# Patient Record
Sex: MALE | Race: OTHER | URBAN - METROPOLITAN AREA
[De-identification: names, ages, dates, MRNs, and addresses within clinical notes are randomized per-mention and may not be internally consistent; named-entity substitution may affect disease eponyms.]

---

## 2024-11-21 ENCOUNTER — EMERGENCY (EMERGENCY)
Facility: HOSPITAL | Age: 59
LOS: 1 days | Discharge: HOME | End: 2024-11-21
Admitting: EMERGENCY MEDICINE
Payer: COMMERCIAL

## 2024-11-21 VITALS — HEART RATE: 48 BPM

## 2024-11-21 VITALS
DIASTOLIC BLOOD PRESSURE: 91 MMHG | HEART RATE: 50 BPM | TEMPERATURE: 98 F | RESPIRATION RATE: 17 BRPM | SYSTOLIC BLOOD PRESSURE: 137 MMHG | OXYGEN SATURATION: 95 % | HEIGHT: 66 IN

## 2024-11-21 PROCEDURE — 99284 EMERGENCY DEPT VISIT MOD MDM: CPT

## 2024-11-21 RX ORDER — GLYCERIN/PROPYLENE GLYCOL 0.6 %-0.6%
1 DROPPERETTE, SINGLE-USE DROP DISPENSER OPHTHALMIC (EYE) ONCE
Refills: 0 | Status: COMPLETED | OUTPATIENT
Start: 2024-11-21 | End: 2024-11-21

## 2024-11-21 RX ORDER — VALACYCLOVIR HYDROCHLORIDE 500 MG/1
1000 TABLET ORAL ONCE
Refills: 0 | Status: COMPLETED | OUTPATIENT
Start: 2024-11-21 | End: 2024-11-21

## 2024-11-21 RX ORDER — VALACYCLOVIR HYDROCHLORIDE 500 MG/1
1 TABLET ORAL
Qty: 21 | Refills: 0
Start: 2024-11-21 | End: 2024-11-27

## 2024-11-21 RX ADMIN — Medication 1 DROP(S): at 19:18

## 2024-11-21 RX ADMIN — VALACYCLOVIR HYDROCHLORIDE 1000 MILLIGRAM(S): 500 TABLET ORAL at 19:17

## 2024-11-21 RX ADMIN — Medication 80 MILLIGRAM(S): at 19:17

## 2024-11-21 NOTE — ED PROVIDER NOTE - NSFOLLOWUPINSTRUCTIONS_ED_ALL_ED_FT
Parálisis de Gómez    LO QUE NECESITAS SABER:    La parálisis de [place] es vijaya debilidad o parálisis repentina de un lado de la lina. La parálisis de [place] ocurre cuando el nervio que controla los músculos de la lina se hincha o irrita.     INSTRUCCIONES DE DESCARGA:    Medicinas:     Se pueden administrar medicamentos para disminuir la hinchazón y la irritación del nervio facial. Es posible que reciba medicamentos antivirales si molina proveedor de atención médica chapo que un virus causó molina parálisis de [place]. Molina proveedor de atención médica también puede sugerir acetaminofén o ibuprofeno para reducir el dolor. Estos medicamentos están disponibles sin receta médica. Pregunte qué medicamento janette y cuánto necesita. Siga las instrucciones. El paracetamol puede causar daño hepático y el ibuprofeno puede causar sangrado estomacal o daño renal.     [place] molina medicamento según las indicaciones. Comuníquese con molina proveedor de atención médica si chapo que molina medicamento no está ayudando o si tiene efectos secundarios. Dígale si usted es alérgico a algún medicamento. Lleve vijaya lista de los medicamentos, vitaminas y hierbas que leeroy.     [place] molina medicamento según las indicaciones. Comuníquese con molina proveedor de atención médica si chapo que molina medicamento no está ayudando o si tiene efectos secundarios. Dígale si usted es alérgico a algún medicamento. Lleve vijaya lista de los medicamentos, vitaminas y hierbas que leeroy. Incluya las cantidades, y cuándo y por qué las leeroy. Lleve la lista o los frascos de pastillas a las visitas de seguimiento. Lleve consigo molina lista de medicamentos en agustin de vijaya emergencia.    Mariana vijaya michele de control con molina proveedor de atención médica según las indicaciones: Anote maico preguntas para que recuerde hacerlas judy maico visitas.    Cuidado de los ojos: Es posible que molina proveedor de atención médica le recomiende que use lágrimas artificiales judy el día para mantener los ojos húmedos. Es posible que necesite usar un ungüento para los ojos por la noche. Es posible que también necesite usar un parche sobre el meg y cerrarlo con cinta adhesiva mientras duerme. [place] ayuda a evitar que el meg se seque y se infecte. Use gafas de sol para proteger molina meg radha solar directa. Manténgase alejado de lugares que tengan humos, polvo u otras partículas en el aire que puedan dañar molina meg.    Fisioterapia: Un fisioterapeuta puede enseñarle a masajearse la lina y a ejercitar los nervios y músculos de la lina. [place] puede ayudarlo a mejorar más pronto y prevenir problemas a joselo plazo. Puede hacer ejercicio por molina cuenta cuando molina movimiento facial comience a regresar. Abre y molly el meg, guiña un meg y sonríe ampliamente. Haz los ejercicios judy 15 o 20 minutos varias veces al día.    Comunícate con tu proveedor de atención médica si:     Tiene fiebre.    El meg se enrojece, se irrita o duele.    Tiene preguntas o inquietudes sobre molina afección o atención.    Regrese a la hedy de emergencias si:     Desarrolla debilidad o entumecimiento en un lado del cuerpo (que no sea la lina).    Tiene visión doble o pierde la visión en el meg.    Tiene problemas para pensar con claridad. [place] L[place] GOT[place] CADA 1-6 HORAS.     SAMANTHA LOS ESTEROIDES 50MG EN LA MANANA Y 20MG EN LA NOCHE CADA DON    SAMANTHA VALTREX 3 VECES AL DON.     SIGUE CON MOLINA MEDICO EN LUNES.     Parálisis de Gómez    LO QUE NECESITAS SABER:    La parálisis de [place] es vijaya debilidad o parálisis repentina de un lado de la lina. La parálisis de [place] ocurre cuando el nervio que controla los músculos de la lina se hincha o irrita.     INSTRUCCIONES DE DESCARGA:    Medicinas:     Se pueden administrar medicamentos para disminuir la hinchazón y la irritación del nervio facial. Es posible que reciba medicamentos antivirales si molina proveedor de atención médica chapo que un virus causó molina parálisis de G[place]. Molina proveedor de atención médica también puede sugerir acetaminofén o ibuprofeno para reducir el dolor. Estos medicamentos están disponibles sin receta médica. Pregunte qué medicamento janette y cuánto necesita. Siga las instrucciones. El paracetamol puede causar daño hepático y el ibuprofeno puede causar sangrado estomacal o daño renal.     [place] molina medicamento según las indicaciones. Comuníquese con molina proveedor de atención médica si chapo que molina medicamento no está ayudando o si tiene efectos secundarios. Dígale si usted es alérgico a algún medicamento. Lleve vijaya lista de los medicamentos, vitaminas y hierbas que samatnha.     [place] molina medicamento según las indicaciones. Comuníquese con molina proveedor de atención médica si chapo que molina medicamento no está ayudando o si tiene efectos secundarios. Dígale si usted es alérgico a algún medicamento. Lleve vijaya lista de los medicamentos, vitaminas y hierbas que samantha. Incluya las cantidades, y cuándo y por qué las samantha. Lleve la lista o los frascos de pastillas a las visitas de seguimiento. Lleve consigo molina lista de medicamentos en agustin de vijaya emergencia.    Mariana vijaya michele de control con molina proveedor de atención médica según las indicaciones: Anote maico preguntas para que recuerde hacerlas judy maico visitas.    Cuidado de los ojos: Es posible que molina proveedor de atención médica le recomiende que use lágrimas artificiales judy el día para mantener los ojos húmedos. Es posible que necesite usar un ungüento para los ojos por la noche. Es posible que también necesite usar un parche sobre el meg y cerrarlo con cinta adhesiva mientras duerme. [place] ayuda a evitar que el meg se seque y se infecte. Use gafas de sol para proteger molina meg radha solar directa. Manténgase alejado de lugares que tengan humos, polvo u otras partículas en el aire que puedan dañar molina meg.    Fisioterapia: Un fisioterapeuta puede enseñarle a masajearse la lina y a ejercitar los nervios y músculos de la lina. [place] puede ayudarlo a mejorar más pronto y prevenir problemas a joselo plazo. Puede hacer ejercicio por molina cuenta cuando molina movimiento facial comience a regresar. Abre y molly el meg, guiña un meg y sonríe ampliamente. Haz los ejercicios judy 15 o 20 minutos varias veces al día.    Comunícate con tu proveedor de atención médica si:     Tiene fiebre.    El meg se enrojece, se irrita o duele.    Tiene preguntas o inquietudes sobre molina afección o atención.    Regrese a la hedy de emergencias si:     Desarrolla debilidad o entumecimiento en un lado del cuerpo (que no sea la lina).    Tiene visión doble o pierde la visión en el meg.    Tiene problemas para pensar con claridad.

## 2024-11-21 NOTE — ED ADULT NURSE NOTE - NSFALLUNIVINTERV_ED_ALL_ED
Bed/Stretcher in lowest position, wheels locked, appropriate side rails in place/Call bell, personal items and telephone in reach/Instruct patient to call for assistance before getting out of bed/chair/stretcher/Non-slip footwear applied when patient is off stretcher/[place] to call system/Physically safe environment - no spills, clutter or unnecessary equipment/Purposeful proactive rounding/Room/bathroom lighting operational, light cord in reach

## 2024-11-21 NOTE — ED PROVIDER NOTE - OBJECTIVE STATEMENT
59-year-old male with history of HIV and HSV presents today with complaint of right-sided facial droop since yesterday morning.  He had dental work done 2 weeks ago on the right side and was feeling well after this without any anesthesia complications.  He went back to the dentist yesterday to get checked and they prescribed him antibiotics to make sure he did not get an infection.  He has been taking Augmentin since yesterday.  He denies any pain.  He notes that his eye is unable to close on the right side which was only slightly present yesterday but has worsened today.  He denies any recent cough or cold.  He denies any pain to his eye.  He denies any fever or chills, headaches, vision changes, numbness, nausea, vomiting, unsteady gait, memory issues.

## 2024-11-21 NOTE — ED PROVIDER NOTE - ENMT, MLM
Airway patent, Nasal mucosa clear. Mouth with normal mucosa. + right facial droop including flattening of nasolabial fold, unable to close right eyelid, unable to raise right eyebrow, increased tearing of right eye.

## 2024-11-21 NOTE — ED ADULT NURSE NOTE - OBJECTIVE STATEMENT
Patient is a 60 y/o M c/o facial droop. patient reports right sided facial swelling and droop since yesterday morning. Patient reports getting dental work done 2 days ago. Dentist prescribed him antibiotics.  Liam #[id number] [place]

## 2024-11-21 NOTE — ED ADULT TRIAGE NOTE - CHIEF COMPLAINT QUOTE
patient walk in with EMS; right side facial droop x2 days; woke up yesterday morning unable to smile or move right eyebrow; recent dental work 2 weeks and saw dentist yesterday for infection; started on augmentin 11/21

## 2024-11-21 NOTE — ED PROVIDER NOTE - CLINICAL SUMMARY MEDICAL DECISION MAKING FREE TEXT BOX
pt presents with right facial droop including eye and eyebrow. bells palsy likely triggered by recent oral surgery or HSV. instructed how to patch/tape. given eye drops and rx for steroids and antivirals. pt presents with right facial droop including eye and eyebrow. bells palsy likely triggered by recent oral surgery or HSV. instructed how to patch/tape. given eye drops and rx for steroids and antivirals.    low HR noted. EKG shows sinus bradycardia. pt states h/o sinus bradycardia x many years.

## 2024-11-21 NOTE — ED PROVIDER NOTE - PATIENT PORTAL LINK FT
You can access the FollowMyHealth Patient Portal offered by [facility] by registering at the following website: [email or web address]. By joining [facility]’s FollowMyHealth portal, you will also be able to view your health information using other applications (apps) compatible with our system.

## 2024-11-25 DIAGNOSIS — R29.810 FACIAL WEAKNESS: ICD-10-CM

## 2024-11-25 DIAGNOSIS — G51.0 BELL'S PALSY: ICD-10-CM

## 2024-11-25 DIAGNOSIS — R00.1 BRADYCARDIA, UNSPECIFIED: ICD-10-CM

## 2024-11-25 DIAGNOSIS — Z21 ASYMPTOMATIC HUMAN IMMUNODEFICIENCY VIRUS [HIV] INFECTION STATUS: ICD-10-CM
